# Patient Record
Sex: MALE | Race: WHITE | NOT HISPANIC OR LATINO | URBAN - METROPOLITAN AREA
[De-identification: names, ages, dates, MRNs, and addresses within clinical notes are randomized per-mention and may not be internally consistent; named-entity substitution may affect disease eponyms.]

---

## 2020-07-16 ENCOUNTER — TRANSFERRED RECORDS (OUTPATIENT)
Dept: HEALTH INFORMATION MANAGEMENT | Facility: CLINIC | Age: 85
End: 2020-07-16

## 2021-04-23 ENCOUNTER — MEDICAL CORRESPONDENCE (OUTPATIENT)
Dept: HEALTH INFORMATION MANAGEMENT | Facility: CLINIC | Age: 86
End: 2021-04-23

## 2021-04-23 ENCOUNTER — TRANSFERRED RECORDS (OUTPATIENT)
Dept: HEALTH INFORMATION MANAGEMENT | Facility: CLINIC | Age: 86
End: 2021-04-23

## 2021-04-26 ENCOUNTER — TRANSCRIBE ORDERS (OUTPATIENT)
Dept: OTHER | Age: 86
End: 2021-04-26

## 2021-04-26 DIAGNOSIS — M48.061 SPINAL STENOSIS OF LUMBAR REGION: Primary | ICD-10-CM

## 2021-04-28 NOTE — TELEPHONE ENCOUNTER
RECORDS RECEIVED FROM: Spinal stenosis of lumbar region/CT done 7/16/2020 and 9/4/2019 at [facility] in [place]/Imani Collado MD at [facility]/Medicare&BCBS/[facility]   DATE RECEIVED: May 4, 2021   NOTES STATUS DETAILS   OFFICE NOTE from referring provider Care Everywhere Imani Collado MD     OFFICE NOTE from other specialist N/A    DISCHARGE SUMMARY from hospital N/A    DISCHARGE REPORT from the ER N/A    OPERATIVE REPORT N/A    MEDICATION LIST Care Everywhere    IMPLANT RECORD/STICKER N/A    LABS     CBC/DIFF N/A    CULTURES N/A    INJECTIONS DONE IN RADIOLOGY N/A    MRI N/A    CT SCAN In process    XRAYS (IMAGES & REPORTS) In process    TUMOR     PATHOLOGY  Slides & report N/A    04/30/21   10:13 AM   Images resolved in PACS  Complete  Zaina Thomas CMA    04/28/21   10:23 AM   FAXED REQUEST TO [facility] FOR IMAGES [phone]  Zaian Thomas CMA

## 2021-05-03 DIAGNOSIS — M54.50 LUMBAR PAIN: Primary | ICD-10-CM

## 2021-05-04 ENCOUNTER — PRE VISIT (OUTPATIENT)
Dept: ORTHOPEDICS | Facility: CLINIC | Age: 86
End: 2021-05-04

## 2021-05-04 ENCOUNTER — TELEPHONE (OUTPATIENT)
Dept: ORTHOPEDICS | Facility: CLINIC | Age: 86
End: 2021-05-04

## 2021-05-04 ENCOUNTER — ANCILLARY PROCEDURE (OUTPATIENT)
Dept: GENERAL RADIOLOGY | Facility: CLINIC | Age: 86
End: 2021-05-04
Attending: ORTHOPAEDIC SURGERY
Payer: MEDICARE

## 2021-05-04 ENCOUNTER — OFFICE VISIT (OUTPATIENT)
Dept: ORTHOPEDICS | Facility: CLINIC | Age: 86
End: 2021-05-04
Payer: MEDICARE

## 2021-05-04 VITALS — WEIGHT: 208 LBS | HEIGHT: 73 IN | BODY MASS INDEX: 27.57 KG/M2

## 2021-05-04 DIAGNOSIS — M48.062 SPINAL STENOSIS OF LUMBAR REGION WITH NEUROGENIC CLAUDICATION: ICD-10-CM

## 2021-05-04 DIAGNOSIS — M54.16 LUMBAR RADICULOPATHY: Primary | ICD-10-CM

## 2021-05-04 PROCEDURE — 99204 OFFICE O/P NEW MOD 45 MIN: CPT | Performed by: ORTHOPAEDIC SURGERY

## 2021-05-04 PROCEDURE — 72110 X-RAY EXAM L-2 SPINE 4/>VWS: CPT | Performed by: RADIOLOGY

## 2021-05-04 RX ORDER — LISINOPRIL/HYDROCHLOROTHIAZIDE 10-12.5 MG
TABLET ORAL
COMMUNITY
Start: 2020-12-30

## 2021-05-04 RX ORDER — AMLODIPINE BESYLATE 5 MG/1
TABLET ORAL
COMMUNITY
Start: 2020-12-30

## 2021-05-04 ASSESSMENT — MIFFLIN-ST. JEOR: SCORE: 1667.36

## 2021-05-04 NOTE — PROGRESS NOTES
Spine Surgery Consultation    REFERRING PHYSICIAN: No ref. provider found   PRIMARY CARE PHYSICIAN: No primary care provider on file.           Chief Complaint:   Consult (lumbar spine pain. has been getting false singlans for his bowels and urine. also is having some balance issues. right leg radiating symptoms.  has tried PT in the past. has tried 1 injection had relief for about 6 weeks. during the 6 weeks his bowel and urine incontinence got worse. no spine surgeries )      History of Present Illness:  Symptom Profile Including: location of symptoms, onset, severity, exacerbating/alleviating factors, previous treatments:        Stuart Nicolas V is a 88 year old male who has multiple medical issues and is referred for evaluation by his primary care doctor and neurologist for 3 separate problems.  The first is balance issues.  He says he feels unsteady when he is trying to shovel snow for example he might fall over and sometimes he will fall.  The second issue is feeling like he will have to urinate at night and this seems to coincide with back plane.  The third issue is back pain and right leg radicular complaints somewhat in an L5 distribution.  This is the least concerning to him.  He says the most concerning is the balance issue.  They are wondering from a neurology standpoint if his low back is contributing.  He has done therapy in the past.  He also had an epidural injection in September 2020 which gave him 6 weeks of significant relief.         Past Medical History:   No past medical history on file.         Past Surgical History:   No past surgical history on file.         Social History:     Social History     Tobacco Use     Smoking status: Never Smoker     Smokeless tobacco: Never Used   Substance Use Topics     Alcohol use: Not on file            Family History:   No family history on file.         Allergies:   No Known Allergies         Medications:     Current Outpatient Medications   Medication      "amLODIPine (NORVASC) 5 MG tablet     lisinopril-hydrochlorothiazide (ZESTORETIC) 10-12.5 MG tablet     No current facility-administered medications for this visit.              Review of Systems:     A 10 point ROS was performed and reviewed. Specific responses to these questions are noted at the end of the document.         Physical Exam:   Vitals: Ht 1.854 m (6' 1\")   Wt 94.3 kg (208 lb)   BMI 27.44 kg/m    Constitutional: awake, alert, cooperative, no apparent distress, appears stated age.    Eyes: The sclera are white.  Ears, Nose, Throat: The trachea is midline.  Psychiatric: The patient has a normal affect.  Respiratory: breathing non-labored  Cardiovascular: The extremities are warm and perfused.  Skin: no obvious rashes or lesions.  Musculoskeletal, Neurologic, and Spine:        Lumbar Spine:    Appearance - No gross stepoffs or deformities    Motor -     L2-3: Hip flexion 5/5 R and 5/5 L strength          L3/4:  Knee extension R 5/5 and L 5/5 strength         L4/5:  Foot dorsiflexion R 5/5 L 5/5 and       EHL dorsiflexion R 4/5 L 4/5 strength         S1:  Plantarflexion/Peroneal Muscles  R 5/5 and L 5/5 strength    Sensation: intact to light touch L3-S1 distribution BLE      Neurologic:      REFLEXES Left Right                  Patella 1+ 1+   Ankle jerk 1+ 1+   Babinski No upgoing great toe No upgoing great toe   Clonus 0 beats 0 beats         Alignment:  Patient stands with a neutral standing sagittal balance.           Imaging:   We ordered and independently reviewed new radiographs at this clinic visit. The results were discussed with the patient.  Findings include:    Lumbar radiographs today show multilevel spondylosis    CT scan of July 2020 shows multilevel spondylosis difficult to assess for stenosis on these images    I reviewed a 2009 lumbar MRI which showed moderate stenosis at L2-3 and multilevel stenosis             Assessment and Plan:   Assessment:  88 year old male with longstanding back " pain, balance issues and some urinary issues vertically at nighttime     Plan:  1. I explained that I think his balance is likely multifactorial, contributed to in part by his deafness, his general musculoskeletal conditioning agent cognitive decline and may also be somewhat related to his occurring radicular pain.  However I do not think that is the only cause.  With regards to his urinary issues I told him I think this is also likely multifactorial, could be due to prostate issues or could be due to pain and sometimes lumbar pain can cause urinary issues, but I do not think he has any signs of cauda equina.  2. With regards to his lumbar spine it does seem he likely has a stenosis and radicular pain problem.  I recommended a CT myelogram.  Based on this we could then target an injection at the worst area of stenosis.  He cannot have an MRI because of his cochlear implant.  3. I explained that if injections stop working potentially we can consider a decompression which is a less invasive surgery, but given his age I think this should be a last resort and they are not particularly interested in surgery.  They are in agreement with this plan and we will move forward with getting the myelogram and I will discuss with them via telephone once it is available.      Respectfully,  Gagandeep Briggs MD  Spine Surgery  [facility]

## 2021-05-04 NOTE — TELEPHONE ENCOUNTER
Patient was seen today but left early. RN then called patient and also spoke with patient's son Gigi. Gave him the imaging scheduling number so he can call to get CT myelogram for patient. That can be done in [place]. Gigi will inform RN once the appt is made so RN can put schedule patient for a phone visit.    Jojo Thomas RN

## 2021-05-04 NOTE — NURSING NOTE
"Reason For Visit:   Chief Complaint   Patient presents with     Consult     lumbar spine pain. has been getting false singlans for his bowels and urine. also is having some balance issues. right leg radiating symptoms.  has tried PT in the past. has tried 1 injection had relief for about 6 weeks. during the 6 weeks his bowel and urine incontinence got worse. no spine surgeries        Primary MD: No primary care provider on file.  Ref. MD: Self     ?  No  Occupation retired .  Currently working? No.  Work status?  Retired.  Date of injury: has been having spine pain for may years.   Type of injury: chronic .  Date of surgery: none   Type of surgery: none .  Smoker: No  Request smoking cessation information: No    Ht 1.854 m (6' 1\")   Wt 94.3 kg (208 lb)   BMI 27.44 kg/m           Oswestry (URIEL) Questionnaire    No flowsheet data found.         Neck Disability Index (NDI) Questionnaire    No flowsheet data found.                Promis 10 Assessment    No flowsheet data found.             Arturo Veronica, ATC  "

## 2021-05-04 NOTE — TELEPHONE ENCOUNTER
RN called and spoke with Chalo, patient's son.   RN told him he will fax the CT myelogram order to [facility].  Chalo expressed understanding. Will call RN once the imaging is obtained.    Jojo Thomas RN

## 2021-05-04 NOTE — LETTER
Date:May 10, 2021      Patient was self referred, no letter generated. Do not send.        [facility] Health Information

## 2021-05-04 NOTE — LETTER
5/4/2021         RE: Stuart Nicolas V  [address] [address] [address]        Dear Colleague,    Thank you for referring your patient, Stuart Nicolas V, to the [facility] [place]. Please see a copy of my visit note below.    Spine Surgery Consultation    REFERRING PHYSICIAN: No ref. provider found   PRIMARY CARE PHYSICIAN: No primary care provider on file.           Chief Complaint:   Consult (lumbar spine pain. has been getting false singlans for his bowels and urine. also is having some balance issues. right leg radiating symptoms.  has tried PT in the past. has tried 1 injection had relief for about 6 weeks. during the 6 weeks his bowel and urine incontinence got worse. no spine surgeries )      History of Present Illness:  Symptom Profile Including: location of symptoms, onset, severity, exacerbating/alleviating factors, previous treatments:        Stuart Nicolas V is a 88 year old male who has multiple medical issues and is referred for evaluation by his primary care doctor and neurologist for 3 separate problems.  The first is balance issues.  He says he feels unsteady when he is trying to shovel snow for example he might fall over and sometimes he will fall.  The second issue is feeling like he will have to urinate at night and this seems to coincide with back plane.  The third issue is back pain and right leg radicular complaints somewhat in an L5 distribution.  This is the least concerning to him.  He says the most concerning is the balance issue.  They are wondering from a neurology standpoint if his low back is contributing.  He has done therapy in the past.  He also had an epidural injection in September 2020 which gave him 6 weeks of significant relief.         Past Medical History:   No past medical history on file.         Past Surgical History:   No past surgical history on file.         Social History:     Social History     Tobacco Use     Smoking  "status: Never Smoker     Smokeless tobacco: Never Used   Substance Use Topics     Alcohol use: Not on file            Family History:   No family history on file.         Allergies:   No Known Allergies         Medications:     Current Outpatient Medications   Medication     amLODIPine (NORVASC) 5 MG tablet     lisinopril-hydrochlorothiazide (ZESTORETIC) 10-12.5 MG tablet     No current facility-administered medications for this visit.              Review of Systems:     A 10 point ROS was performed and reviewed. Specific responses to these questions are noted at the end of the document.         Physical Exam:   Vitals: Ht 1.854 m (6' 1\")   Wt 94.3 kg (208 lb)   BMI 27.44 kg/m    Constitutional: awake, alert, cooperative, no apparent distress, appears stated age.    Eyes: The sclera are white.  Ears, Nose, Throat: The trachea is midline.  Psychiatric: The patient has a normal affect.  Respiratory: breathing non-labored  Cardiovascular: The extremities are warm and perfused.  Skin: no obvious rashes or lesions.  Musculoskeletal, Neurologic, and Spine:        Lumbar Spine:    Appearance - No gross stepoffs or deformities    Motor -     L2-3: Hip flexion 5/5 R and 5/5 L strength          L3/4:  Knee extension R 5/5 and L 5/5 strength         L4/5:  Foot dorsiflexion R 5/5 L 5/5 and       EHL dorsiflexion R 4/5 L 4/5 strength         S1:  Plantarflexion/Peroneal Muscles  R 5/5 and L 5/5 strength    Sensation: intact to light touch L3-S1 distribution BLE      Neurologic:      REFLEXES Left Right                  Patella 1+ 1+   Ankle jerk 1+ 1+   Babinski No upgoing great toe No upgoing great toe   Clonus 0 beats 0 beats         Alignment:  Patient stands with a neutral standing sagittal balance.           Imaging:   We ordered and independently reviewed new radiographs at this clinic visit. The results were discussed with the patient.  Findings include:    Lumbar radiographs today show multilevel spondylosis    CT " scan of July 2020 shows multilevel spondylosis difficult to assess for stenosis on these images    I reviewed a 2009 lumbar MRI which showed moderate stenosis at L2-3 and multilevel stenosis             Assessment and Plan:   Assessment:  88 year old male with longstanding back pain, balance issues and some urinary issues vertically at nighttime     Plan:  1. I explained that I think his balance is likely multifactorial, contributed to in part by his deafness, his general musculoskeletal conditioning agent cognitive decline and may also be somewhat related to his occurring radicular pain.  However I do not think that is the only cause.  With regards to his urinary issues I told him I think this is also likely multifactorial, could be due to prostate issues or could be due to pain and sometimes lumbar pain can cause urinary issues, but I do not think he has any signs of cauda equina.  2. With regards to his lumbar spine it does seem he likely has a stenosis and radicular pain problem.  I recommended a CT myelogram.  Based on this we could then target an injection at the worst area of stenosis.  He cannot have an MRI because of his cochlear implant.  3. I explained that if injections stop working potentially we can consider a decompression which is a less invasive surgery, but given his age I think this should be a last resort and they are not particularly interested in surgery.  They are in agreement with this plan and we will move forward with getting the myelogram and I will discuss with them via telephone once it is available.      Respectfully,  Gagandeep Briggs MD  Spine Surgery  [facility]          Again, thank you for allowing me to participate in the care of your patient.        Sincerely,        Gagandeep Briggs MD

## 2021-05-04 NOTE — TELEPHONE ENCOUNTER
[facility] Call Center    Phone Message    May a detailed message be left on voicemail: yes     Reason for Call: Other: Patients son is calling to let Lyle know that the imaging center in [place] will not schedule him to get his imaging done. If you could give him a call back to discuss an alternative for patient.      Action Taken: Message routed to:  [facility] ORTHO    Travel Screening: Not Applicable

## 2021-05-05 ENCOUNTER — TELEPHONE (OUTPATIENT)
Dept: ORTHOPEDICS | Facility: CLINIC | Age: 86
End: 2021-05-05

## 2021-05-05 NOTE — TELEPHONE ENCOUNTER
RN faxed Dr. Briggs's notes per [facility] request so they can call patient to schedule for CT Myelogram.    Jojo Thomas RN        [facility] Call Center    Phone Message    May a detailed message be left on voicemail: yes     Reason for Call: Other: [facility] received order for patient to have Mylogram, though they also need notes. Please fax to [phone] so they can schedule patient, Stuart Nicolas      Action Taken: Message routed to:  [facility] Ortho    Travel Screening: Not Applicable

## 2021-05-18 ENCOUNTER — TRANSFERRED RECORDS (OUTPATIENT)
Dept: HEALTH INFORMATION MANAGEMENT | Facility: CLINIC | Age: 86
End: 2021-05-18

## 2021-06-02 ENCOUNTER — TELEPHONE (OUTPATIENT)
Dept: ORTHOPEDICS | Facility: CLINIC | Age: 86
End: 2021-06-02

## 2021-06-02 NOTE — TELEPHONE ENCOUNTER
RN called and left patient a detailed VM.  Scheduled patient to have a phone visit with Dr. Briggs to discuss CT myelogram this Friday at 9.45am.  If patient calls back,please relay this message.    Jojo Thomas RN      [facility]    Phone Message    May a detailed message be left on voicemail: yes     Reason for Call: Requesting Results   Name/type of test: CT Myelogram  Date of test: 5/18/2021  Was test done at a location other than [facility] (Please fill in the location if not [facility])?: Yes: [facility] in [place]     Action Taken: Message routed to:  [facility] Ortho    Travel Screening: Not Applicable

## 2021-06-04 ENCOUNTER — TELEPHONE (OUTPATIENT)
Dept: ORTHOPEDICS | Facility: CLINIC | Age: 86
End: 2021-06-04

## 2021-06-04 ENCOUNTER — VIRTUAL VISIT (OUTPATIENT)
Dept: ORTHOPEDICS | Facility: CLINIC | Age: 86
End: 2021-06-04
Payer: MEDICARE

## 2021-06-04 DIAGNOSIS — M54.16 LUMBAR RADICULOPATHY: Primary | ICD-10-CM

## 2021-06-04 PROCEDURE — 99442 PR PHYSICIAN TELEPHONE EVALUATION 11-20 MIN: CPT | Mod: 95 | Performed by: ORTHOPAEDIC SURGERY

## 2021-06-04 NOTE — LETTER
6/4/2021         RE: Stuart Nicolas V  [address] [address]        Dear Colleague,    Thank you for referring your patient, Stuart Nicolas V, to the [facility] [place]. Please see a copy of my visit note below.    Pradeep is a 88 year old who is being evaluated via a billable telephone visit.      What phone number would you like to be contacted at? Home  How would you like to obtain your AVS? [employer]  Phone call duration: 13 minutes    I called and spoke with Pradeep today.  After our last visit he obtained a CT myelogram of the lumbar spine.  This shows severe stenosis at L4-5.  He reports on the phone today that his symptoms are basically unchanged.  We discussed injections.  We also discussed possible surgery but given his age and the comorbidities we discussed that surgery could be a last resort for us.  He is interested in proceeding with the injection.  I told him I would order an L4-5 interlaminar epidural to see if this will help with his symptoms.  Asked him to reach out to us with any questions or concerns in the future.      Again, thank you for allowing me to participate in the care of your patient.        Sincerely,        Gagandeep Briggs MD

## 2021-06-04 NOTE — NURSING NOTE
Reason For Visit:   Chief Complaint   Patient presents with     RECHECK     follow up to discuss CT myelogram results.        There were no vitals taken for this visit.         Arturo Veronica ATC

## 2021-06-04 NOTE — TELEPHONE ENCOUNTER
RN called and spoke with patient. Gave patient the scheduling number to call to get his injection. Patient has cochlear implant and has some difficulty but finally able to get the number. Patient lives alone and stated he will try to arrange a ride and then he will schedule for the injection.    Jojo Thomas RN

## 2021-06-04 NOTE — LETTER
Date:June 12, 2021      Patient was self referred, no letter generated. Do not send.        [facility] Health Information

## 2021-06-04 NOTE — PROGRESS NOTES
Pradeep is a 88 year old who is being evaluated via a billable telephone visit.      What phone number would you like to be contacted at? Home  How would you like to obtain your AVS? Sourav  Phone call duration: 13 minutes    I called and spoke with Pradeep today.  After our last visit he obtained a CT myelogram of the lumbar spine.  This shows severe stenosis at L4-5.  He reports on the phone today that his symptoms are basically unchanged.  We discussed injections.  We also discussed possible surgery but given his age and the comorbidities we discussed that surgery could be a last resort for us.  He is interested in proceeding with the injection.  I told him I would order an L4-5 interlaminar epidural to see if this will help with his symptoms.  Asked him to reach out to us with any questions or concerns in the future.

## 2021-06-07 DIAGNOSIS — Z11.59 ENCOUNTER FOR SCREENING FOR OTHER VIRAL DISEASES: ICD-10-CM

## 2021-06-27 ENCOUNTER — HEALTH MAINTENANCE LETTER (OUTPATIENT)
Age: 86
End: 2021-06-27

## 2021-07-12 ENCOUNTER — ANCILLARY PROCEDURE (OUTPATIENT)
Dept: GENERAL RADIOLOGY | Facility: CLINIC | Age: 86
End: 2021-07-12
Attending: ORTHOPAEDIC SURGERY
Payer: MEDICARE

## 2021-07-12 VITALS
DIASTOLIC BLOOD PRESSURE: 68 MMHG | RESPIRATION RATE: 17 BRPM | OXYGEN SATURATION: 96 % | SYSTOLIC BLOOD PRESSURE: 129 MMHG | HEART RATE: 82 BPM | TEMPERATURE: 97.6 F

## 2021-07-12 DIAGNOSIS — M54.16 LUMBAR RADICULOPATHY: ICD-10-CM

## 2021-07-12 PROCEDURE — 62323 NJX INTERLAMINAR LMBR/SAC: CPT | Performed by: RADIOLOGY

## 2021-07-12 RX ORDER — BUPIVACAINE HYDROCHLORIDE 5 MG/ML
10 INJECTION, SOLUTION EPIDURAL; INTRACAUDAL ONCE
Status: COMPLETED | OUTPATIENT
Start: 2021-07-12 | End: 2021-07-12

## 2021-07-12 RX ORDER — IOPAMIDOL 408 MG/ML
10 INJECTION, SOLUTION INTRATHECAL ONCE
Status: COMPLETED | OUTPATIENT
Start: 2021-07-12 | End: 2021-07-12

## 2021-07-12 RX ORDER — LIDOCAINE HYDROCHLORIDE 10 MG/ML
30 INJECTION, SOLUTION EPIDURAL; INFILTRATION; INTRACAUDAL; PERINEURAL ONCE
Status: COMPLETED | OUTPATIENT
Start: 2021-07-12 | End: 2021-07-12

## 2021-07-12 RX ORDER — METHYLPREDNISOLONE ACETATE 80 MG/ML
80 INJECTION, SUSPENSION INTRA-ARTICULAR; INTRALESIONAL; INTRAMUSCULAR; SOFT TISSUE ONCE
Status: COMPLETED | OUTPATIENT
Start: 2021-07-12 | End: 2021-07-12

## 2021-07-12 RX ADMIN — IOPAMIDOL 4 ML: 408 INJECTION, SOLUTION INTRATHECAL at 08:21

## 2021-07-12 RX ADMIN — METHYLPREDNISOLONE ACETATE 80 MG: 80 INJECTION, SUSPENSION INTRA-ARTICULAR; INTRALESIONAL; INTRAMUSCULAR; SOFT TISSUE at 08:21

## 2021-07-12 RX ADMIN — BUPIVACAINE HYDROCHLORIDE 10 MG: 5 INJECTION, SOLUTION EPIDURAL; INTRACAUDAL at 08:20

## 2021-07-12 RX ADMIN — LIDOCAINE HYDROCHLORIDE 5 ML: 10 INJECTION, SOLUTION EPIDURAL; INFILTRATION; INTRACAUDAL; PERINEURAL at 08:21

## 2021-07-12 NOTE — IR NOTE
Pt did well with the procedure.  No complications. VSS. AVS given and pt escorted to the waiting room.

## 2021-10-17 ENCOUNTER — HEALTH MAINTENANCE LETTER (OUTPATIENT)
Age: 86
End: 2021-10-17

## 2021-11-11 ENCOUNTER — APPOINTMENT (OUTPATIENT)
Dept: URBAN - METROPOLITAN AREA CLINIC 255 | Age: 86
Setting detail: DERMATOLOGY
End: 2021-11-14

## 2021-11-11 PROBLEM — C44.01 BASAL CELL CARCINOMA OF SKIN OF LIP: Status: ACTIVE | Noted: 2021-11-11

## 2021-11-11 PROCEDURE — OTHER MOHS SURGERY: OTHER

## 2021-11-11 PROCEDURE — OTHER PRESCRIPTION: OTHER

## 2021-11-11 PROCEDURE — 17311 MOHS 1 STAGE H/N/HF/G: CPT

## 2021-11-11 PROCEDURE — 14061 TIS TRNFR E/N/E/L10.1-30SQCM: CPT

## 2021-11-11 PROCEDURE — OTHER MIPS QUALITY: OTHER

## 2021-11-11 RX ORDER — CEPHALEXIN 250 MG/1
1 TABLET TABLET ORAL BID
Qty: 10 | Refills: 0 | Status: ERX | COMMUNITY
Start: 2021-11-11

## 2021-11-11 NOTE — PROCEDURE: MOHS SURGERY
Body Location Override (Optional - Billing Will Still Be Based On Selected Body Map Location If Applicable): Left Upper Cutaneous Lip

## 2021-11-11 NOTE — PROCEDURE: MOHS SURGERY
Subsequent Stages Histo Method Verbiage: Using a similar technique to that described above, a thin layer of tissue was removed from all areas where tumor was visible on the previous stage.  The tissue was again oriented, mapped, dyed, and processed as above. Unna Boot Text: An Unna boot was placed to help immobilize the limb and facilitate more rapid healing.

## 2021-11-11 NOTE — PROCEDURE: MIPS QUALITY
Quality 111:Pneumonia Vaccination Status For Older Adults: Pneumococcal Vaccination not Administered or Previously Received, Reason not Otherwise Specified
Detail Level: Detailed
Quality 226: Preventive Care And Screening: Tobacco Use: Screening And Cessation Intervention: Patient screened for tobacco use and is an ex/non-smoker
Quality 143: Oncology: Medical And Radiation- Pain Intensity Quantified: Pain severity quantified, no pain present
Quality 130: Documentation Of Current Medications In The Medical Record: Current Medications Documented
Quality 431: Preventive Care And Screening: Unhealthy Alcohol Use - Screening: Patient not identified as an unhealthy alcohol user when screened for unhealthy alcohol use using a systematic screening method
Quality 110: Preventive Care And Screening: Influenza Immunization: Influenza Immunization not Administered because Patient Refused.

## 2022-07-24 ENCOUNTER — HEALTH MAINTENANCE LETTER (OUTPATIENT)
Age: 87
End: 2022-07-24

## 2022-10-03 ENCOUNTER — HEALTH MAINTENANCE LETTER (OUTPATIENT)
Age: 87
End: 2022-10-03

## 2023-08-12 ENCOUNTER — HEALTH MAINTENANCE LETTER (OUTPATIENT)
Age: 88
End: 2023-08-12

## (undated) RX ORDER — LIDOCAINE HYDROCHLORIDE 10 MG/ML
INJECTION, SOLUTION EPIDURAL; INFILTRATION; INTRACAUDAL; PERINEURAL
Status: DISPENSED
Start: 2021-07-12

## (undated) RX ORDER — METHYLPREDNISOLONE ACETATE 80 MG/ML
INJECTION, SUSPENSION INTRA-ARTICULAR; INTRALESIONAL; INTRAMUSCULAR; SOFT TISSUE
Status: DISPENSED
Start: 2021-07-12

## (undated) RX ORDER — BUPIVACAINE HYDROCHLORIDE 5 MG/ML
INJECTION, SOLUTION EPIDURAL; INTRACAUDAL
Status: DISPENSED
Start: 2021-07-12